# Patient Record
(demographics unavailable — no encounter records)

---

## 2025-06-15 NOTE — CONSULT LETTER
[Dear  ___] : Dear  [unfilled], [Courtesy Letter:] : I had the pleasure of seeing your patient, [unfilled], in my office today. [Please see my note below.] : Please see my note below. [Sincerely,] : Sincerely, [FreeTextEntry2] : Dr. Carlson  [FreeTextEntry3] : Abisai Fernandez MD\par  Attending Surgeon\par  Division of Thoracic Surgery\par  , [facility] [employer] at [facility]\par

## 2025-06-15 NOTE — ASSESSMENT
[FreeTextEntry1] : KALEY HERNANDEZ. 64 y/o female, former smoker (25 PPY; Quit in 2003)with Pmhx of GERD and Gluacoma; She is s/p Left VATS, LLL lobectomy, MLND, pneumolysis and decortication on 01/08/2016 for pT1aN0 stage IA typical carcinoid.   I have reviewed the patient's medical records and diagnostic images at time of this office consultation and have made the following recommendation: 1.

## 2025-06-15 NOTE — CONSULT LETTER
[Dear  ___] : Dear  [unfilled], [Courtesy Letter:] : I had the pleasure of seeing your patient, [unfilled], in my office today. [Please see my note below.] : Please see my note below. [Sincerely,] : Sincerely, [FreeTextEntry2] : Dr. Carlson  [FreeTextEntry3] : Abisai Fernadnez MD\par  Attending Surgeon\par  Division of Thoracic Surgery\par  , [facility] [employer] [facility] at [facility]\par

## 2025-06-15 NOTE — CONSULT LETTER
[Dear  ___] : Dear  [unfilled], [Courtesy Letter:] : I had the pleasure of seeing your patient, [unfilled], in my office today. [Please see my note below.] : Please see my note below. [Sincerely,] : Sincerely, [FreeTextEntry2] : Dr. Carlson  [FreeTextEntry3] : Abisai Fernandez MD\par  Attending Surgeon\par  Division of Thoracic Surgery\par  , [facility] [employer] [facility] at [facility] [place]\par

## 2025-06-15 NOTE — HISTORY OF PRESENT ILLNESS
[FreeTextEntry1] : KALEY HERNANDEZ. 62 y/o female, former smoker (25 PPY; Quit in 2003)with Pmhx of GERD and Gluacoma; She is s/p Left VATS, LLL lobectomy, MLND, pneumolysis and decortication on 01/08/2016 for pT1aN0 stage IA typical carcinoid.   CT chest on 06/13/2025: (PH) - There is postoperative change about the left hemithorax compatible with prior left lower lobectomy.  - There are scattered calcified pulmonary nodules including a 6 mm calcified nodule in the right lower lobe as seen on series 4 image 73. - In the right lower lobe on series 4 image 100 there is a nodule measuring up to 1.6 cm which is stable compared to the prior study from 2023. This nodule has a mucus impaction appearance.  - In the left upper lobe posteriorly on series 4 image 90 there is a 6 mm nodule which is stable compared to the prior study from 2023.  Patient is followed today via Telephonic visit. 
normal...